# Patient Record
Sex: MALE | URBAN - METROPOLITAN AREA
[De-identification: names, ages, dates, MRNs, and addresses within clinical notes are randomized per-mention and may not be internally consistent; named-entity substitution may affect disease eponyms.]

---

## 2020-08-07 ENCOUNTER — OFFICE VISIT (OUTPATIENT)
Dept: URBAN - METROPOLITAN AREA TELEHEALTH 2 | Facility: TELEHEALTH | Age: 45
End: 2020-08-07
Payer: COMMERCIAL

## 2020-08-07 DIAGNOSIS — K57.92 DIVERTICULITIS: ICD-10-CM

## 2020-08-07 DIAGNOSIS — R10.84 ABDOMINAL PAIN, GENERALIZED: ICD-10-CM

## 2020-08-07 DIAGNOSIS — R19.5 LOOSE STOOLS: ICD-10-CM

## 2020-08-07 DIAGNOSIS — A04.72 CLOSTRIDIUM DIFFICILE DIARRHEA: ICD-10-CM

## 2020-08-07 PROCEDURE — 99244 OFF/OP CNSLTJ NEW/EST MOD 40: CPT | Performed by: INTERNAL MEDICINE

## 2020-08-07 PROCEDURE — 99204 OFFICE O/P NEW MOD 45 MIN: CPT | Performed by: INTERNAL MEDICINE

## 2020-08-07 RX ORDER — MESALAMINE 1.2 G/1
2 TABLETS TABLET, DELAYED RELEASE ORAL ONCE A DAY
Qty: 28 TABLET | Refills: 0 | OUTPATIENT
Start: 2020-08-07 | End: 2020-08-21

## 2020-08-07 RX ORDER — DICYCLOMINE HYDROCHLORIDE 10 MG/1
1 CAPSULES CAPSULE ORAL
Qty: 30 | Refills: 1 | OUTPATIENT

## 2020-08-07 NOTE — PHYSICAL EXAM NECK/THYROID:
normal appearance , without tenderness upon palpation , no deformities , trachea midline , Thyroid normal size , no thyroid nodules , no masses , no JVD , thyroid nontender
Patient with one or more new problems requiring additional work-up/treatment.

## 2020-08-07 NOTE — HPI-TODAY'S VISIT:
45 y.o. WM, , 3 daughters (Tre - 11, 9, 5), works for [employer] PCP - Humphrey Marquez Last 8/19 - [place] - woke up w/ LLQ - couldn't go running which was wierd - ER _ _ diverticulitis - Augmentin (10 days) 2 days after stopping Augumentin, syx came back, went on Cipro / Flagyl On this abx, syx got worse _ _ admitted for 2 days w/ Zosyn followed by 14 days of cipro / flagyl Then, got C. diff _ _ did vanco  Felt fine for a month In Nov, got diverticulitis again (CT x 3) - cipro flagyl for 10 days again C-scope in 11/20 - all good _ _ 1 little polyp Cruising along doing well In June this yr, diverticulitis again (no CT) - cipro / flagyl _ _ good for 1or 2 days July 4th - 10 days later - not away - another cipro and flagyl Then got thrush - did lozenges - then did fluconazole - now thrush gone Now, can push hard on stomach, diverticulitis is gone, but L side of abd is crampy, uncomfortable  Not tender, just blah, crampy - perpetual On fluconazole, tight and real; no, loose or dark; not solid Don't feel like c. diff No fevers

## 2020-08-09 ENCOUNTER — WEB ENCOUNTER (OUTPATIENT)
Dept: URBAN - METROPOLITAN AREA CLINIC 92 | Facility: CLINIC | Age: 45
End: 2020-08-09

## 2020-08-14 ENCOUNTER — TELEPHONE ENCOUNTER (OUTPATIENT)
Dept: URBAN - METROPOLITAN AREA CLINIC 90 | Facility: CLINIC | Age: 45
End: 2020-08-14

## 2020-08-14 LAB
CALPROTECTIN, STOOL - QDX: (no result)
GASTROINTESTINAL PATHOGEN: (no result)

## 2020-11-23 ENCOUNTER — WEB ENCOUNTER (OUTPATIENT)
Dept: URBAN - METROPOLITAN AREA CLINIC 92 | Facility: CLINIC | Age: 45
End: 2020-11-23

## 2020-11-30 ENCOUNTER — TELEPHONE ENCOUNTER (OUTPATIENT)
Dept: URBAN - METROPOLITAN AREA CLINIC 92 | Facility: CLINIC | Age: 45
End: 2020-11-30

## 2020-11-30 RX ORDER — DICYCLOMINE HYDROCHLORIDE 10 MG/1
1-2 CAPSULES CAPSULE ORAL
Qty: 60 | Refills: 1 | OUTPATIENT
Start: 2020-11-30 | End: 2021-01-29

## 2020-12-09 ENCOUNTER — OFFICE VISIT (OUTPATIENT)
Dept: URBAN - METROPOLITAN AREA TELEHEALTH 2 | Facility: TELEHEALTH | Age: 45
End: 2020-12-09
Payer: COMMERCIAL

## 2020-12-09 DIAGNOSIS — K57.30 DIVERTICULA OF COLON: ICD-10-CM

## 2020-12-09 PROCEDURE — 99214 OFFICE O/P EST MOD 30 MIN: CPT | Performed by: INTERNAL MEDICINE

## 2020-12-09 NOTE — HPI-OTHER HISTORIES
45 y.o. WM Feeling a lot better Completed 14 days of Augmentin w/ Florastor  Pain subsided - can push on stomach as much as wants - did Pel[employer] last night (worked up a sweat)

## 2021-04-29 ENCOUNTER — OFFICE VISIT (OUTPATIENT)
Dept: URBAN - METROPOLITAN AREA TELEHEALTH 2 | Facility: TELEHEALTH | Age: 46
End: 2021-04-29
Payer: COMMERCIAL

## 2021-04-29 DIAGNOSIS — K57.30 DIVERTICULA OF COLON: ICD-10-CM

## 2021-04-29 PROBLEM — 398050005 DIVERTICULAR DISEASE OF COLON: Status: ACTIVE | Noted: 2020-12-09

## 2021-04-29 PROCEDURE — 99213 OFFICE O/P EST LOW 20 MIN: CPT | Performed by: INTERNAL MEDICINE

## 2021-04-29 RX ORDER — AMOXICILLIN AND CLAVULANATE POTASSIUM 875; 125 MG/1; MG/1
1 TABLET TABLET, FILM COATED ORAL
Qty: 20 | Refills: 1 | OUTPATIENT
End: 2021-05-19

## 2021-04-29 NOTE — HPI-TODAY'S VISIT:
46 y.o. WM Since last discussion, met w/ Dr. Bertin Briones - he is awesome After meeting w/ him, went to [place], and developed issues 2nd time flying to [place] getting it Augmentin works better than others Now doing well In [place], took pills, went away Back to [place] now What is the game plan? Feels great now No issues

## 2022-04-19 ENCOUNTER — WEB ENCOUNTER (OUTPATIENT)
Dept: URBAN - METROPOLITAN AREA CLINIC 92 | Facility: CLINIC | Age: 47
End: 2022-04-19

## 2022-04-19 RX ORDER — AMOXICILLIN AND CLAVULANATE POTASSIUM 875; 125 MG/1; MG/1
1 TABLET TABLET, FILM COATED ORAL
Qty: 20 | Refills: 1
End: 2022-05-09

## 2022-06-08 ENCOUNTER — WEB ENCOUNTER (OUTPATIENT)
Dept: URBAN - METROPOLITAN AREA CLINIC 92 | Facility: CLINIC | Age: 47
End: 2022-06-08

## 2022-06-08 RX ORDER — AMOXICILLIN AND CLAVULANATE POTASSIUM 875; 125 MG/1; MG/1
1 TABLET TABLET, FILM COATED ORAL
Qty: 20 | Refills: 1
End: 2022-06-28

## 2022-07-27 ENCOUNTER — OFFICE VISIT (OUTPATIENT)
Dept: URBAN - METROPOLITAN AREA TELEHEALTH 2 | Facility: TELEHEALTH | Age: 47
End: 2022-07-27
Payer: COMMERCIAL

## 2022-07-27 DIAGNOSIS — K57.92 DIVERTICULITIS: ICD-10-CM

## 2022-07-27 PROBLEM — 307496006 DIVERTICULITIS: Status: ACTIVE | Noted: 2020-11-23

## 2022-07-27 PROCEDURE — 99214 OFFICE O/P EST MOD 30 MIN: CPT | Performed by: INTERNAL MEDICINE

## 2022-07-27 NOTE — HPI-TODAY'S VISIT:
47 y.o. WM  Seen a year ago, but have been in contact w/ pt since 3/21, 9/21, 4/22, 6/22 had flare ups - 4 in 14 months - responds super well to Augmentin Feels entirely normal now When normal, normal Unclear how to think about this When it sucks, it sucks

## 2022-10-08 ENCOUNTER — DASHBOARD ENCOUNTERS (OUTPATIENT)
Age: 47
End: 2022-10-08

## 2023-05-11 NOTE — PHYSICAL EXAM CHEST:
breathing is unlabored without accessory muscle use.
days of the week/months of the year/alphabet/spontaneous greetings

## 2023-08-23 ENCOUNTER — WEB ENCOUNTER (OUTPATIENT)
Dept: URBAN - METROPOLITAN AREA CLINIC 96 | Facility: CLINIC | Age: 48
End: 2023-08-23

## 2023-08-23 RX ORDER — AMOXICILLIN AND CLAVULANATE POTASSIUM 875; 125 MG/1; MG/1
1 TABLET AS DIRECTED TABLET, FILM COATED ORAL
Qty: 20 TABLET | Refills: 1
End: 2023-09-12

## 2025-04-28 ENCOUNTER — OFFICE VISIT (OUTPATIENT)
Dept: URBAN - METROPOLITAN AREA CLINIC 96 | Facility: CLINIC | Age: 50
End: 2025-04-28

## 2025-06-06 ENCOUNTER — OFFICE VISIT (OUTPATIENT)
Dept: URBAN - METROPOLITAN AREA CLINIC 96 | Facility: CLINIC | Age: 50
End: 2025-06-06

## 2025-06-08 ENCOUNTER — OFFICE VISIT (OUTPATIENT)
Dept: URBAN - METROPOLITAN AREA TELEHEALTH 2 | Facility: TELEHEALTH | Age: 50
End: 2025-06-08
Payer: COMMERCIAL

## 2025-06-08 ENCOUNTER — LAB OUTSIDE AN ENCOUNTER (OUTPATIENT)
Dept: URBAN - METROPOLITAN AREA TELEHEALTH 2 | Facility: TELEHEALTH | Age: 50
End: 2025-06-08

## 2025-06-08 DIAGNOSIS — K57.90 DIVERTICULAR DISEASE: ICD-10-CM

## 2025-06-08 DIAGNOSIS — Z86.0101 H/O ADENOMATOUS POLYP OF COLON: ICD-10-CM

## 2025-06-08 PROBLEM — 397881000: Status: ACTIVE | Noted: 2025-06-08

## 2025-06-08 PROCEDURE — 99214 OFFICE O/P EST MOD 30 MIN: CPT | Performed by: INTERNAL MEDICINE

## 2025-06-08 NOTE — HPI-TODAY'S VISIT:
51 yo WM Well known to me President's day weekend - did augmentin - knocked things out Did Prenovo scan - will email it to me Wants to talk through surgery - its not going away 1 flare up / year Comes at opportune time Feels great now

## 2025-06-09 ENCOUNTER — TELEPHONE ENCOUNTER (OUTPATIENT)
Dept: URBAN - METROPOLITAN AREA CLINIC 96 | Facility: CLINIC | Age: 50
End: 2025-06-09

## 2025-08-04 ENCOUNTER — TELEPHONE ENCOUNTER (OUTPATIENT)
Dept: URBAN - METROPOLITAN AREA CLINIC 96 | Facility: CLINIC | Age: 50
End: 2025-08-04

## 2025-08-04 RX ORDER — SODIUM, POTASSIUM,MAG SULFATES 17.5-3.13G
177ML SOLUTION, RECONSTITUTED, ORAL ORAL
Qty: 1 | Refills: 0 | OUTPATIENT
Start: 2025-08-04 | End: 2025-08-05

## 2025-08-06 ENCOUNTER — OFFICE VISIT (OUTPATIENT)
Dept: URBAN - METROPOLITAN AREA SURGERY CENTER 18 | Facility: SURGERY CENTER | Age: 50
End: 2025-08-06
Payer: COMMERCIAL

## 2025-08-06 ENCOUNTER — CLAIMS CREATED FROM THE CLAIM WINDOW (OUTPATIENT)
Dept: URBAN - METROPOLITAN AREA CLINIC 4 | Facility: CLINIC | Age: 50
End: 2025-08-06
Payer: COMMERCIAL

## 2025-08-06 DIAGNOSIS — D12.2 BENIGN NEOPLASM OF ASCENDING COLON: ICD-10-CM

## 2025-08-06 DIAGNOSIS — E78.00 HIGH CHOLESTEROL: ICD-10-CM

## 2025-08-06 DIAGNOSIS — K63.5 SESSILE COLONIC POLYP: ICD-10-CM

## 2025-08-06 DIAGNOSIS — D12.2 ADENOMA OF ASCENDING COLON: ICD-10-CM

## 2025-08-06 DIAGNOSIS — K63.89 OTHER SPECIFIED DISEASES OF INTESTINE: ICD-10-CM

## 2025-08-06 DIAGNOSIS — Z09 ENCNTR FOR F/U EXAM AFT TRTMT FOR COND OTH THAN MALIG NEOPLM: ICD-10-CM

## 2025-08-06 DIAGNOSIS — Z86.0101 PERSONAL HISTORY OF ADENOMATOUS AND SERRATED COLON POLYPS: ICD-10-CM

## 2025-08-06 DIAGNOSIS — Z09 ENCOUNTER FOR FOLLOW-UP EXAMINATION AFTER COMPLETED TREATMENT FOR CONDITIONS OTHER THAN MALIGNANT NEOPLASM: ICD-10-CM

## 2025-08-06 DIAGNOSIS — Z86.0100 PERSONAL HISTORY OF COLONIC POLYPS: ICD-10-CM

## 2025-08-06 PROCEDURE — 00811 ANES LWR INTST NDSC NOS: CPT | Performed by: REGISTERED NURSE

## 2025-08-06 PROCEDURE — 45380 COLONOSCOPY AND BIOPSY: CPT | Performed by: INTERNAL MEDICINE

## 2025-08-06 PROCEDURE — 45385 COLONOSCOPY W/LESION REMOVAL: CPT | Performed by: INTERNAL MEDICINE

## 2025-08-06 PROCEDURE — 0529F INTRVL 3/>YR PTS CLNSCP DOCD: CPT | Performed by: INTERNAL MEDICINE

## 2025-08-06 PROCEDURE — 88305 TISSUE EXAM BY PATHOLOGIST: CPT | Performed by: PATHOLOGY
